# Patient Record
Sex: MALE | Race: WHITE | NOT HISPANIC OR LATINO | Employment: FULL TIME | URBAN - METROPOLITAN AREA
[De-identification: names, ages, dates, MRNs, and addresses within clinical notes are randomized per-mention and may not be internally consistent; named-entity substitution may affect disease eponyms.]

---

## 2021-03-05 ENCOUNTER — IMMUNIZATION (OUTPATIENT)
Dept: VACCINE CLINIC | Facility: HOSPITAL | Age: 36
End: 2021-03-05

## 2021-03-05 PROCEDURE — 0001A: CPT | Performed by: INTERNAL MEDICINE

## 2021-03-05 PROCEDURE — 91300 HC SARSCOV02 VAC 30MCG/0.3ML IM: CPT | Performed by: INTERNAL MEDICINE

## 2021-03-26 ENCOUNTER — IMMUNIZATION (OUTPATIENT)
Dept: VACCINE CLINIC | Facility: HOSPITAL | Age: 36
End: 2021-03-26

## 2021-03-26 PROCEDURE — 0002A: CPT | Performed by: INTERNAL MEDICINE

## 2021-03-26 PROCEDURE — 91300 HC SARSCOV02 VAC 30MCG/0.3ML IM: CPT | Performed by: INTERNAL MEDICINE

## 2023-02-20 ENCOUNTER — OFFICE VISIT (OUTPATIENT)
Dept: INTERNAL MEDICINE | Age: 38
End: 2023-02-20
Payer: COMMERCIAL

## 2023-02-20 ENCOUNTER — PATIENT ROUNDING (BHMG ONLY) (OUTPATIENT)
Dept: INTERNAL MEDICINE | Age: 38
End: 2023-02-20
Payer: COMMERCIAL

## 2023-02-20 VITALS
HEIGHT: 70 IN | TEMPERATURE: 97.6 F | HEART RATE: 68 BPM | OXYGEN SATURATION: 98 % | SYSTOLIC BLOOD PRESSURE: 122 MMHG | BODY MASS INDEX: 44.67 KG/M2 | WEIGHT: 312 LBS | DIASTOLIC BLOOD PRESSURE: 76 MMHG

## 2023-02-20 DIAGNOSIS — Z11.59 NEED FOR HEPATITIS C SCREENING TEST: ICD-10-CM

## 2023-02-20 DIAGNOSIS — Z23 ENCOUNTER FOR IMMUNIZATION: ICD-10-CM

## 2023-02-20 DIAGNOSIS — E66.01 CLASS 3 SEVERE OBESITY DUE TO EXCESS CALORIES WITH SERIOUS COMORBIDITY AND BODY MASS INDEX (BMI) OF 40.0 TO 44.9 IN ADULT: ICD-10-CM

## 2023-02-20 DIAGNOSIS — Z76.89 ENCOUNTER TO ESTABLISH CARE: ICD-10-CM

## 2023-02-20 DIAGNOSIS — Z00.00 ANNUAL PHYSICAL EXAM: Primary | ICD-10-CM

## 2023-02-20 DIAGNOSIS — Z30.09 STERILIZATION CONSULT: ICD-10-CM

## 2023-02-20 PROCEDURE — 99385 PREV VISIT NEW AGE 18-39: CPT

## 2023-02-20 PROCEDURE — 90471 IMMUNIZATION ADMIN: CPT

## 2023-02-20 PROCEDURE — 90715 TDAP VACCINE 7 YRS/> IM: CPT

## 2023-02-20 NOTE — PROGRESS NOTES
"    I N T E R N A L  M E D I C I N E  FAYE Ontiveros      ENCOUNTER DATE:  02/20/2023    Jorge De Jesus / 37 y.o. / male    CHIEF COMPLAINT     Annual Exam and Establish Care    No problems or concerns.    BMI of 44: He reports he has been working with a  regularly and following keto diet.      Reports he has had a prior negative sleep study.    VITALS     Visit Vitals  /76   Pulse 68   Temp 97.6 °F (36.4 °C)   Ht 177.8 cm (70\")   Wt (!) 142 kg (312 lb)   SpO2 98%   BMI 44.77 kg/m²       BP Readings from Last 3 Encounters:   02/20/23 122/76     Wt Readings from Last 3 Encounters:   02/20/23 (!) 142 kg (312 lb)      Body mass index is 44.77 kg/m².      MEDICATIONS     No current outpatient medications on file prior to visit.     No current facility-administered medications on file prior to visit.         HISTORY OF PRESENT ILLNESS      Jorge presents for annual health maintenance visit.    · General health: good  · Lifestyle:  · Attempting to lose weight?: Yes   · Diet: eats a well balanced, healthy diet, focused on whole foods and Keto  · Exercise: exercises 3 days/week  · Tobacco: Former smoker   · Alcohol: does not drink  · Work: Full-time  · Reproductive health:  · Sexually active?: Yes   · Concern for STD?: No   · Sexual problems?: No problems   · Sees Urologist?: No   · Depression Screening:      PHQ-2/PHQ-9 Depression Screening 2/20/2023   Little Interest or Pleasure in Doing Things 0-->not at all   Feeling Down, Depressed or Hopeless 0-->not at all   PHQ-9: Brief Depression Severity Measure Score 0         PHQ-2: 0 (Not depressed)     PHQ-9: 0 (Negative screening for depression)    Patient Care Team:  Jeannette Sal APRN as PCP - General (Family Medicine)  ______________________________________________________________________    ALLERGIES  No Known Allergies     PFSH:     The following portions of the patient's history were reviewed and updated as appropriate: Allergies / Current " Medications / Past Medical History / Surgical History / Social History / Family History    PROBLEM LIST   There is no problem list on file for this patient.      PAST MEDICAL HISTORY  History reviewed. No pertinent past medical history.    SURGICAL HISTORY  History reviewed. No pertinent surgical history.    SOCIAL HISTORY  Social History     Socioeconomic History   • Marital status: Single   Tobacco Use   • Smoking status: Former     Types: Cigarettes   • Smokeless tobacco: Never   Substance and Sexual Activity   • Alcohol use: Not Currently   • Drug use: Never   • Sexual activity: Yes     Partners: Female       FAMILY HISTORY  Family History   Problem Relation Age of Onset   • No Known Problems Mother    • No Known Problems Father    • Brain cancer Brother    • Brain cancer Paternal Uncle        IMMUNIZATION HISTORY  Immunization History   Administered Date(s) Administered   • COVID-19 (PFIZER) PURPLE CAP 03/05/2021, 03/26/2021   • Tdap 02/20/2023         REVIEW OF SYSTEMS     Review of Systems   Constitutional: Negative for chills, fever and unexpected weight change.   Respiratory: Negative for cough, chest tightness and shortness of breath.    Cardiovascular: Negative for chest pain, palpitations and leg swelling.   Neurological: Negative for dizziness, weakness, light-headedness and headaches.   Psychiatric/Behavioral: The patient is not nervous/anxious.        PHYSICAL EXAMINATION     Physical Exam  Vitals reviewed.   Constitutional:       General: He is not in acute distress.     Appearance: Normal appearance. He is not ill-appearing, toxic-appearing or diaphoretic.   HENT:      Head: Normocephalic and atraumatic.      Right Ear: Tympanic membrane, ear canal and external ear normal. There is no impacted cerumen.      Left Ear: Tympanic membrane, ear canal and external ear normal. There is no impacted cerumen.      Nose: Nose normal. No congestion or rhinorrhea.      Mouth/Throat:      Mouth: Mucous membranes  are moist.      Pharynx: Oropharynx is clear. No oropharyngeal exudate or posterior oropharyngeal erythema.   Eyes:      Extraocular Movements: Extraocular movements intact.      Conjunctiva/sclera: Conjunctivae normal.      Pupils: Pupils are equal, round, and reactive to light.   Cardiovascular:      Rate and Rhythm: Normal rate and regular rhythm.      Heart sounds: Normal heart sounds.   Pulmonary:      Effort: Pulmonary effort is normal. No respiratory distress.      Breath sounds: Normal breath sounds.   Abdominal:      General: Bowel sounds are normal.      Palpations: Abdomen is soft.      Tenderness: There is no abdominal tenderness.   Musculoskeletal:         General: Normal range of motion.      Cervical back: Normal range of motion and neck supple.      Right lower leg: No edema.      Left lower leg: No edema.   Lymphadenopathy:      Cervical: No cervical adenopathy.   Skin:     General: Skin is warm and dry.   Neurological:      General: No focal deficit present.      Mental Status: He is alert and oriented to person, place, and time. Mental status is at baseline.   Psychiatric:         Mood and Affect: Mood normal.         Behavior: Behavior normal.         Thought Content: Thought content normal.         Judgment: Judgment normal.         REVIEWED DATA      Labs:    No results found for: NA, K, CALCIUM, AST, ALT, BUN, CREATININE, EGFRIFNONA, EGFRIFAFRI    No results found for: GLUCOSE, HGBA1C, TSH, FREET4    No results found for: PSA    [unfilled]    No results found for: LDL, HDL, TRIG, CHOLHDLRATIO    No components found for: [id number]    No results found for: WBC, HGB, MCV, PLT    No results found for: PROTEIN, GLUCOSEU, BLOODU, NITRITEU, LEUKOCYTESUR     No results found for: HEPCVIRUSABY    Imaging:           Medical Tests:           ASSESSMENT & PLAN     ANNUAL WELLNESS EXAM / PHYSICAL     Diagnoses and all orders for this visit:    1. Annual physical exam (Primary)  -     Hemoglobin A1c  -     CBC  & Differential  -     Comprehensive Metabolic Panel  -     Lipid Panel With / Chol / HDL Ratio  -     TSH  -     Urinalysis With Microscopic If Indicated (No Culture) - Urine, Clean Catch    2. Encounter to establish care    3. Class 3 severe obesity due to excess calories with serious comorbidity and body mass index (BMI) of 40.0 to 44.9 in adult (HCC)  -     Vascular Screening (Bundle) CAR; Future    4. Encounter for immunization  -     Tdap Vaccine Greater Than or Equal To 8yo IM    5. Sterilization consult  -     Ambulatory Referral to Urology    6. Need for hepatitis C screening test  -     Hepatitis C Antibody         Summary/Discussion:     · Agreeable to update labs.  · Declined bariatrics referral.  Would potentially be interested in GLP-1 weight loss management.  No personal/ family history of thyroid disease or pancreatitis.  Reviewed risks/ benefits/ side effects.  Will continue to pursue weight loss through diet and exercise and reach back out in a couple months if interested.      Next Appointment with me: Visit date not found    Return in about 1 year (around 2/20/2024) for Annual physical.      HEALTHCARE MAINTENANCE ISSUES       Cancer Screening:  · Colon: Initial/Next screening at age: 46  · Repeat colon cancer screening: N/A at this time  · Prostate: Start screening at 45 then annually  · Testicular: Recommended monthly self exam  · Skin: Monthly self skin examination, annual exam by health professional  · Lung: Does not meet criteria for lung cancer screening.   · Other:    Screening Labs & Tests:  · Lab results reviewed & discussed with with patient or orders placed today.  · EKG:  · CV Screening: Lipid panel  · DEXA (75+ or risk factors):   · HEP C (If born 8195-4636 or risk factors): Ordered  · Other:     Immunization/Vaccinations (to be given today unless deferred by patient)  · Influenza: Recommended annual influenza vaccine  · Hepatitis A: Verify immunization records  · Hepatitis B: Verify  immunization records  · Tetanus/Pertussis: Administer today  · Pneumovax/PCV: Not needed at this time  · Shingles: Not needed at this time  · COVID: Recommended the bivalent booster shot  Lifestyle Counseling:  · Lifestyle Modifications: Attempt to lose weight, Continue good lifestyle choices/modifications, Increase intensity/regularity of aerobic exercise, Make dinner the lightest meal of day and Reduce exposure to stress if possible  · Safety Issues: Always wear seatbelt, Avoid texting while driving   · Use sunscreen, regular skin examination  · Recommended annual dental/vision examination.  · Emotional/Stress/Sleep: Reviewed and  given when appropriate      Health Maintenance   Topic Date Due   • HEPATITIS C SCREENING  Never done   • COVID-19 Vaccine (3 - Booster for Pfizer series) 02/22/2023 (Originally 5/21/2021)   • INFLUENZA VACCINE  03/31/2023 (Originally 8/1/2022)   • ANNUAL PHYSICAL  02/20/2024   • TDAP/TD VACCINES (2 - Td or Tdap) 02/20/2033   • Pneumococcal Vaccine 0-64  Aged Out

## 2023-02-20 NOTE — PROGRESS NOTES
A My-Chart message has been sent to the patient [place] [facility] [place]

## 2023-02-21 PROBLEM — E78.00 PURE HYPERCHOLESTEROLEMIA: Status: ACTIVE | Noted: 2023-02-21

## 2023-02-21 LAB
ALBUMIN SERPL-MCNC: 4.3 G/DL (ref 4–5)
ALBUMIN/GLOB SERPL: 1.5 {RATIO} (ref 1.2–2.2)
ALP SERPL-CCNC: 74 IU/L (ref 44–121)
ALT SERPL-CCNC: 27 IU/L (ref 0–44)
APPEARANCE UR: CLEAR
AST SERPL-CCNC: 25 IU/L (ref 0–40)
BASOPHILS # BLD AUTO: 0.1 X10E3/UL (ref 0–0.2)
BASOPHILS NFR BLD AUTO: 1 %
BILIRUB SERPL-MCNC: 0.4 MG/DL (ref 0–1.2)
BILIRUB UR QL STRIP: NEGATIVE
BUN SERPL-MCNC: 13 MG/DL (ref 6–20)
BUN/CREAT SERPL: 16 (ref 9–20)
CALCIUM SERPL-MCNC: 8.9 MG/DL (ref 8.7–10.2)
CHLORIDE SERPL-SCNC: 105 MMOL/L (ref 96–106)
CHOLEST SERPL-MCNC: 186 MG/DL (ref 100–199)
CHOLEST/HDLC SERPL: 4.8 RATIO (ref 0–5)
CO2 SERPL-SCNC: 20 MMOL/L (ref 20–29)
COLOR UR: YELLOW
CREAT SERPL-MCNC: 0.81 MG/DL (ref 0.76–1.27)
EGFRCR SERPLBLD CKD-EPI 2021: 117 ML/MIN/1.73
EOSINOPHIL # BLD AUTO: 0.1 X10E3/UL (ref 0–0.4)
EOSINOPHIL NFR BLD AUTO: 2 %
ERYTHROCYTE [DISTWIDTH] IN BLOOD BY AUTOMATED COUNT: 13.2 % (ref 11.6–15.4)
GLOBULIN SER CALC-MCNC: 2.8 G/DL (ref 1.5–4.5)
GLUCOSE SERPL-MCNC: 92 MG/DL (ref 70–99)
GLUCOSE UR QL STRIP: NEGATIVE
HBA1C MFR BLD: 5.1 % (ref 4.8–5.6)
HCT VFR BLD AUTO: 44.5 % (ref 37.5–51)
HCV IGG SERPL QL IA: NON REACTIVE
HDLC SERPL-MCNC: 39 MG/DL
HGB BLD-MCNC: 14.8 G/DL (ref 13–17.7)
HGB UR QL STRIP: NEGATIVE
IMM GRANULOCYTES # BLD AUTO: 0 X10E3/UL (ref 0–0.1)
IMM GRANULOCYTES NFR BLD AUTO: 0 %
KETONES UR QL STRIP: NEGATIVE
LDLC SERPL CALC-MCNC: 130 MG/DL (ref 0–99)
LEUKOCYTE ESTERASE UR QL STRIP: NEGATIVE
LYMPHOCYTES # BLD AUTO: 1.2 X10E3/UL (ref 0.7–3.1)
LYMPHOCYTES NFR BLD AUTO: 21 %
MCH RBC QN AUTO: 30.6 PG (ref 26.6–33)
MCHC RBC AUTO-ENTMCNC: 33.3 G/DL (ref 31.5–35.7)
MCV RBC AUTO: 92 FL (ref 79–97)
MICRO URNS: NORMAL
MONOCYTES # BLD AUTO: 0.4 X10E3/UL (ref 0.1–0.9)
MONOCYTES NFR BLD AUTO: 8 %
NEUTROPHILS # BLD AUTO: 3.7 X10E3/UL (ref 1.4–7)
NEUTROPHILS NFR BLD AUTO: 68 %
NITRITE UR QL STRIP: NEGATIVE
PH UR STRIP: 6.5 [PH] (ref 5–7.5)
PLATELET # BLD AUTO: 214 X10E3/UL (ref 150–450)
POTASSIUM SERPL-SCNC: 4.6 MMOL/L (ref 3.5–5.2)
PROT SERPL-MCNC: 7.1 G/DL (ref 6–8.5)
PROT UR QL STRIP: NEGATIVE
RBC # BLD AUTO: 4.84 X10E6/UL (ref 4.14–5.8)
SODIUM SERPL-SCNC: 139 MMOL/L (ref 134–144)
SP GR UR STRIP: 1.01 (ref 1–1.03)
TRIGL SERPL-MCNC: 90 MG/DL (ref 0–149)
TSH SERPL DL<=0.005 MIU/L-ACNC: 1.32 UIU/ML (ref 0.45–4.5)
UROBILINOGEN UR STRIP-MCNC: 0.2 MG/DL (ref 0.2–1)
VLDLC SERPL CALC-MCNC: 17 MG/DL (ref 5–40)
WBC # BLD AUTO: 5.4 X10E3/UL (ref 3.4–10.8)

## 2023-08-08 ENCOUNTER — HOSPITAL ENCOUNTER (OUTPATIENT)
Dept: CARDIOLOGY | Facility: HOSPITAL | Age: 38
Discharge: HOME | End: 2023-08-08

## 2023-08-08 VITALS
WEIGHT: 300 LBS | HEIGHT: 71 IN | SYSTOLIC BLOOD PRESSURE: 109 MMHG | DIASTOLIC BLOOD PRESSURE: 69 MMHG | BODY MASS INDEX: 42 KG/M2 | HEART RATE: 47 BPM

## 2023-08-08 DIAGNOSIS — E66.01 CLASS 3 SEVERE OBESITY DUE TO EXCESS CALORIES WITH SERIOUS COMORBIDITY AND BODY MASS INDEX (BMI) OF 40.0 TO 44.9 IN ADULT: ICD-10-CM

## 2023-08-08 LAB
BH CV ECHO MEAS - DIST AO DIAM: 1.58 CM
BH CV VAS BP LEFT ARM: NORMAL MMHG
BH CV VAS BP RIGHT ARM: NORMAL MMHG
BH CV XLRA MEAS - MID AO DIAM: 1.85 CM
BH CV XLRA MEAS - PAD LEFT ABI DP: 1.36
BH CV XLRA MEAS - PAD LEFT ABI PT: 1.37
BH CV XLRA MEAS - PAD LEFT ARM: 108 MMHG
BH CV XLRA MEAS - PAD LEFT LEG DP: 148 MMHG
BH CV XLRA MEAS - PAD LEFT LEG PT: 149 MMHG
BH CV XLRA MEAS - PAD RIGHT ABI DP: 1.37
BH CV XLRA MEAS - PAD RIGHT ABI PT: 1.35
BH CV XLRA MEAS - PAD RIGHT ARM: 109 MMHG
BH CV XLRA MEAS - PAD RIGHT LEG DP: 149 MMHG
BH CV XLRA MEAS - PAD RIGHT LEG PT: 147 MMHG
BH CV XLRA MEAS - PROX AO DIAM: 2 CM
BH CV XLRA MEAS LEFT ICA/CCA RATIO: 0.68
BH CV XLRA MEAS LEFT MID CCA PSV: NORMAL CM/SEC
BH CV XLRA MEAS LEFT MID ICA PSV: NORMAL CM/SEC
BH CV XLRA MEAS LEFT PROX ECA PSV: NORMAL CM/SEC
BH CV XLRA MEAS RIGHT ICA/CCA RATIO: 1
BH CV XLRA MEAS RIGHT MID CCA PSV: NORMAL CM/SEC
BH CV XLRA MEAS RIGHT MID ICA PSV: NORMAL CM/SEC
BH CV XLRA MEAS RIGHT PROX ECA PSV: NORMAL CM/SEC
MAXIMAL PREDICTED HEART RATE: 184
STRESS TARGET HR: 156

## 2023-08-08 PROCEDURE — 93799 UNLISTED CV SVC/PROCEDURE: CPT

## 2024-03-21 ENCOUNTER — OFFICE VISIT (OUTPATIENT)
Dept: INTERNAL MEDICINE | Age: 39
End: 2024-03-21
Payer: COMMERCIAL

## 2024-03-21 VITALS
DIASTOLIC BLOOD PRESSURE: 70 MMHG | BODY MASS INDEX: 42.7 KG/M2 | SYSTOLIC BLOOD PRESSURE: 134 MMHG | HEIGHT: 71 IN | HEART RATE: 78 BPM | TEMPERATURE: 96.7 F | OXYGEN SATURATION: 96 % | WEIGHT: 305 LBS

## 2024-03-21 DIAGNOSIS — Z00.00 ANNUAL PHYSICAL EXAM: Primary | ICD-10-CM

## 2024-03-21 DIAGNOSIS — E78.00 PURE HYPERCHOLESTEROLEMIA: ICD-10-CM

## 2024-03-21 DIAGNOSIS — R03.0 ELEVATED BLOOD PRESSURE READING IN OFFICE WITHOUT DIAGNOSIS OF HYPERTENSION: ICD-10-CM

## 2024-03-21 DIAGNOSIS — R68.82 REDUCED LIBIDO: ICD-10-CM

## 2024-03-21 DIAGNOSIS — L65.9 HAIR LOSS: ICD-10-CM

## 2024-03-21 PROCEDURE — 99395 PREV VISIT EST AGE 18-39: CPT

## 2024-03-21 RX ORDER — DEXTROAMPHETAMINE SACCHARATE, AMPHETAMINE ASPARTATE, DEXTROAMPHETAMINE SULFATE AND AMPHETAMINE SULFATE 2.5; 2.5; 2.5; 2.5 MG/1; MG/1; MG/1; MG/1
TABLET ORAL
COMMUNITY
Start: 2024-03-08

## 2024-03-21 NOTE — PROGRESS NOTES
"    I N T E R N A L  M E D I C I N E  Jeannettekatie Sal, APRRUBEN      ENCOUNTER DATE:  03/21/2024    Jorge Wilkinson De Jesus / 38 y.o. / male    CHIEF COMPLAINT     Annual Exam    He will be getting  this fall in [place].  Continues to work in theater.      BMI of 42: He continues to work with  3x weekly and is following keto diet/ intermittent fasting.       HLD: February 20, 2023 lipid panel with elevated ; normal triglycerides 90.    ADHD: Now on Adderall 10 mg TID with benefit for the last 4 months.  Followed by psychiatry, [facility].       Has noticed some recent hair loss around crown of head.  Also some low libido as of late.   He is requesting testosterone labs at today's visit.        VITALS     Visit Vitals  /70   Pulse 78   Temp 96.7 °F (35.9 °C)   Ht 180.3 cm (70.98\")   Wt (!) 138 kg (305 lb)   SpO2 96%   BMI 42.56 kg/m²       BP Readings from Last 3 Encounters:   03/21/24 134/70   08/08/23 109/69   02/20/23 122/76     Wt Readings from Last 3 Encounters:   03/21/24 (!) 138 kg (305 lb)   08/08/23 136 kg (300 lb)   02/20/23 (!) 142 kg (312 lb)      Body mass index is 42.56 kg/m².      MEDICATIONS     Current Outpatient Medications on File Prior to Visit   Medication Sig Dispense Refill    amphetamine-dextroamphetamine (ADDERALL) 10 MG tablet        No current facility-administered medications on file prior to visit.         HISTORY OF PRESENT ILLNESS      Jorge presents for annual health maintenance visit.    General health: good  Lifestyle:  Attempting to lose weight?: Yes   Diet: eats a well balanced, healthy diet  Exercise: exercises 3-5 days weekly  Tobacco: Never used   Alcohol: 2 days/week and 3 drinks/occasion   Work: Full-time  Reproductive health:  Sexually active?: Yes   Concern for STD?: No   Sexual problems?: diminished libido   Sees Urologist?: No   Depression Screening:          3/21/2024    10:02 AM   PHQ-2/PHQ-9 Depression Screening   Little Interest or Pleasure " in Doing Things 0-->not at all   Feeling Down, Depressed or Hopeless 0-->not at all   PHQ-9: Brief Depression Severity Measure Score 0         PHQ-2: 0 (Not depressed)     PHQ-9: 0 (Negative screening for depression)    Patient Care Team:  Jeannette Sal APRN as PCP - General (Family Medicine)  ______________________________________________________________________    ALLERGIES  No Known Allergies     PFSH:     The following portions of the patient's history were reviewed and updated as appropriate: Allergies / Current Medications / Past Medical History / Surgical History / Social History / Family History    PROBLEM LIST   Patient Active Problem List   Diagnosis    Pure hypercholesterolemia    ADHD (attention deficit hyperactivity disorder)       PAST MEDICAL HISTORY  Past Medical History:   Diagnosis Date    ADHD (attention deficit hyperactivity disorder)        SURGICAL HISTORY  Past Surgical History:   Procedure Laterality Date    VASECTOMY         SOCIAL HISTORY  Social History     Socioeconomic History    Marital status: Single   Tobacco Use    Smoking status: Former     Types: Cigarettes    Smokeless tobacco: Never   Vaping Use    Vaping status: Never Used   Substance and Sexual Activity    Alcohol use: Not Currently    Drug use: Never    Sexual activity: Yes     Partners: Female       FAMILY HISTORY  Family History   Problem Relation Age of Onset    No Known Problems Mother     No Known Problems Father     Brain cancer Brother     Brain cancer Paternal Uncle        IMMUNIZATION HISTORY  Immunization History   Administered Date(s) Administered    COVID-19 (PFIZER) Purple Cap Monovalent 03/05/2021, 03/26/2021    Tdap 02/20/2023         REVIEW OF SYSTEMS     Review of Systems   Constitutional:  Negative for chills, fever and unexpected weight change.   Respiratory:  Negative for cough, chest tightness and shortness of breath.    Cardiovascular:  Negative for chest pain, palpitations and leg swelling.   Endocrine:         +Low libido, hair loss   Neurological:  Negative for dizziness, weakness, light-headedness and headaches.   Psychiatric/Behavioral:  The patient is not nervous/anxious.        PHYSICAL EXAMINATION     Physical Exam  Vitals reviewed.   Constitutional:       General: He is not in acute distress.     Appearance: Normal appearance. He is not ill-appearing, toxic-appearing or diaphoretic.   HENT:      Head: Normocephalic and atraumatic.      Right Ear: Tympanic membrane, ear canal and external ear normal. There is no impacted cerumen.      Left Ear: Tympanic membrane, ear canal and external ear normal. There is no impacted cerumen.      Nose: Nose normal. No congestion or rhinorrhea.      Mouth/Throat:      Mouth: Mucous membranes are moist.      Pharynx: Oropharynx is clear. No oropharyngeal exudate or posterior oropharyngeal erythema.   Eyes:      Extraocular Movements: Extraocular movements intact.      Conjunctiva/sclera: Conjunctivae normal.      Pupils: Pupils are equal, round, and reactive to light.   Cardiovascular:      Rate and Rhythm: Normal rate and regular rhythm.      Heart sounds: Normal heart sounds.   Pulmonary:      Effort: Pulmonary effort is normal. No respiratory distress.      Breath sounds: Normal breath sounds.   Abdominal:      General: Bowel sounds are normal.      Palpations: Abdomen is soft.      Tenderness: There is no abdominal tenderness.   Musculoskeletal:         General: Normal range of motion.      Cervical back: Normal range of motion and neck supple.      Right lower leg: No edema.      Left lower leg: No edema.   Lymphadenopathy:      Cervical: No cervical adenopathy.   Skin:     General: Skin is warm and dry.      Comments: Thinning hair at crown, no skin changes   Neurological:      General: No focal deficit present.      Mental Status: He is alert and oriented to person, place, and time. Mental status is at baseline.   Psychiatric:         Mood and Affect: Mood normal.     "     Behavior: Behavior normal.         Thought Content: Thought content normal.         Judgment: Judgment normal.         REVIEWED DATA      Labs:    Lab Results   Component Value Date     02/20/2023    K 4.6 02/20/2023    CALCIUM 8.9 02/20/2023    AST 25 02/20/2023    ALT 27 02/20/2023    BUN 13 02/20/2023    CREATININE 0.81 02/20/2023       Lab Results   Component Value Date    GLUCOSE 92 02/20/2023    HGBA1C 5.1 02/20/2023    TSH 1.320 02/20/2023       No results found for: \"PSA\"    [unfilled]    Lab Results   Component Value Date     (H) 02/20/2023    HDL 39 (L) 02/20/2023    TRIG 90 02/20/2023    CHOLHDLRATIO 4.8 02/20/2023       No components found for: \"CJUH136R\"    Lab Results   Component Value Date    WBC 5.4 02/20/2023    HGB 14.8 02/20/2023    MCV 92 02/20/2023     02/20/2023       Lab Results   Component Value Date    PROTEIN Negative 02/20/2023    GLUCOSEU Negative 02/20/2023    BLOODU Negative 02/20/2023    NITRITEU Negative 02/20/2023    LEUKOCYTESUR Negative 02/20/2023          Lab Results   Component Value Date    HEPCVIRUSABY Non Reactive 02/20/2023       Imaging:           Medical Tests:           ASSESSMENT & PLAN     ANNUAL WELLNESS EXAM / PHYSICAL     Diagnoses and all orders for this visit:    1. Annual physical exam (Primary)  -     Cancel: CBC & Differential  -     Cancel: Hemoglobin A1c  -     Cancel: TSH+Free T4  -     Urinalysis With Microscopic If Indicated (No Culture) - Urine, Clean Catch  -     CBC & Differential; Future  -     Hemoglobin A1c; Future  -     TSH+Free T4; Future  -     Urinalysis without microscopic (no culture) - Urine, Clean Catch; Future  -     ABO/Rh; Future    2. Elevated blood pressure reading in office without diagnosis of hypertension    3. Pure hypercholesterolemia  -     Cancel: Comprehensive Metabolic Panel  -     Cancel: Lipid Panel With / Chol / HDL Ratio  -     Comprehensive Metabolic Panel; Future  -     Lipid Panel With / Chol / HDL " Ratio; Future    4. Reduced libido  -     Cancel: Testosterone, Free, Total  -     Testosterone, Free, Total; Future    5. Hair loss  -     Cancel: Vitamin B12  -     Cancel: Vitamin D,25-Hydroxy  -     Vitamin D,25-Hydroxy; Future  -     Vitamin B12; Future         Summary/Discussion:     Recommend monitoring BP at home to ensure it is averaging < 130/80.  Follow low sodium diet.  Labs ordered to investigate reduced libido, hair loss, including thyroid, testosterone, B12, Vitamin D.  He has had prior negative sleep study.      Class 3 Severe Obesity (BMI >=40). Obesity-related health conditions include the following: dyslipidemias. Obesity is unchanged. BMI is is above average; BMI management plan is completed. We discussed portion control and increasing exercise.      Next Appointment with me: Visit date not found    Return for Schedule early am fasting lab appointment, 1 year annual physical.      HEALTHCARE MAINTENANCE ISSUES       Cancer Screening:  Colon: Initial/Next screening at age: 45  Repeat colon cancer screening: N/A at this time  Prostate: Start screening at 50 then annually  Testicular: Recommended monthly self exam  Skin: Monthly self skin examination, annual exam by health professional  Lung: Does not meet criteria for lung cancer screening.   Other:    Screening Labs & Tests:  Lab results reviewed & discussed with with patient or orders placed today.  EKG:  CV Screening: Lipid panel  DEXA (75+ or risk factors):   HEP C (If born 6624-2443 or risk factors): Previously had negative screen  Other:     Immunization/Vaccinations (to be given today unless deferred by patient)  Influenza: Recommended annual influenza vaccine  Hepatitis A: Verify immunization records  Hepatitis B: Verify immunization records  Tetanus/Pertussis: Up to date  Pneumovax/PCV: Not needed at this time  Shingles: Not needed at this time  COVID: Considering the latest booster   Lifestyle Counseling:  Lifestyle Modifications: Attempt  to lose weight, Continue good lifestyle choices/modifications, Follow a low fat, low cholesterol diet, Decrease or avoid alcohol intake, and Reduce exposure to stress if possible  Safety Issues: Always wear seatbelt, Avoid texting while driving   Use sunscreen, regular skin examination  Recommended annual dental/vision examination.  Emotional/Stress/Sleep: Reviewed and  given when appropriate      Health Maintenance   Topic Date Due    BMI FOLLOWUP  Never done    LIPID PANEL  02/20/2024    COVID-19 Vaccine (3 - 2023-24 season) 03/23/2024 (Originally 9/1/2023)    INFLUENZA VACCINE  03/31/2024 (Originally 8/1/2023)    ANNUAL PHYSICAL  03/21/2025    TDAP/TD VACCINES (2 - Td or Tdap) 02/20/2033    HEPATITIS C SCREENING  Completed    Pneumococcal Vaccine 0-64  Aged Out

## 2024-05-25 ENCOUNTER — HOSPITAL ENCOUNTER (EMERGENCY)
Facility: HOSPITAL | Age: 39
Discharge: HOME | End: 2024-05-25
Attending: EMERGENCY MEDICINE
Payer: OTHER MISCELLANEOUS

## 2024-05-25 ENCOUNTER — APPOINTMENT (OUTPATIENT)
Dept: GENERAL RADIOLOGY | Facility: HOSPITAL | Age: 39
End: 2024-05-25
Payer: OTHER MISCELLANEOUS

## 2024-05-25 VITALS
SYSTOLIC BLOOD PRESSURE: 142 MMHG | OXYGEN SATURATION: 97 % | HEIGHT: 70 IN | RESPIRATION RATE: 16 BRPM | BODY MASS INDEX: 41.52 KG/M2 | WEIGHT: 290 LBS | TEMPERATURE: 98 F | HEART RATE: 58 BPM | DIASTOLIC BLOOD PRESSURE: 80 MMHG
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
## 2024-05-25 DIAGNOSIS — S52.135A CLOSED NONDISPLACED FRACTURE OF NECK OF LEFT RADIUS, INITIAL ENCOUNTER: Primary | ICD-10-CM

## 2024-05-25 PROCEDURE — 99283 EMERGENCY DEPT VISIT LOW MDM: CPT

## 2024-05-25 PROCEDURE — 73070 X-RAY EXAM OF ELBOW: CPT

## 2024-05-25 PROCEDURE — 73090 X-RAY EXAM OF FOREARM: CPT

## 2024-05-25 RX ORDER — HYDROCODONE BITARTRATE AND ACETAMINOPHEN 5; 325 MG/1; MG/1
1 TABLET ORAL EVERY 6 HOURS PRN
Qty: 12 TABLET | Refills: 0 | Status: SHIPPED | OUTPATIENT
Start: 2024-05-25

## 2024-05-25 NOTE — ED PROVIDER NOTES
MD ATTESTATION NOTE    SHARED VISIT: This visit was performed by BOTH a physician and an APC. The substantive portion of the medical decision making was performed by this attesting physician who made or approved the management plan and takes responsibility for patient management. All studies in the APC note (if performed) were independently interpreted by me.     The BOBBY and I have discussed this patient's history, physical exam, and treatment plan.  I have reviewed the documentation and affirm the documentation and agree with the treatment and plan.  The attached note describes my personal findings.      Independent Historians: Patient    A complete HPI/ROS/PMH/PSH/SH/FH are unobtainable due to: None    Chronic or social conditions impacting patient care (social determinants of health): None    Jorge De Jesus is a 38 y.o. male who presents to the ED c/o acute left forearm pain after a fall with outstretched arms.  Patient describes maximal pain in the proximal forearm and elbow.  Patient went to urgent care today and was told to come to the ER for further evaluation.  Patient is right-handed but does work with his hands.  He denies hitting his head or losing consciousness.  He denies any numbness or tingling or inability to use his left hand.          On exam:  GENERAL: Pleasant cooperative and conversant male, alert, no acute distress  SKIN: Warm, dry  HENT: Normocephalic, atraumatic  RESPIRATORY: Relaxed breathing  MUSCULOSKELETAL: no deformity, 2+ radial pulse left upper extremity, full range of motion of left hand including a thumbs up and finger extension and flexion.  NEURO: alert, moves all extremities, follows commands                                                            Radiology  XR ELBOW 2 VIEW LEFT, XR Forearm 2 View Left    Result Date: 5/25/2024  XR FOREARM 2 VW LEFT-, XR ELBOW 2 VW LEFT-   INDICATION: Fall on outstretched arm. Pain.  COMPARISON: None  TECHNIQUE: 2 view left forearm and 2  view left elbow  FINDINGS:  Nondisplaced radial neck fracture. No dislocation. Anterior sail sign. Posterior fat pad sign.       1. Nondisplaced radial neck fracture. 2. Elbow effusion present  This report was finalized on 5/25/2024 2:33 PM by Dr. Glenn Pavon M.D on Workstation: [id number]       Medical Decision Making:  ED Course as of 05/26/24 1958   Sat May 25, 2024   1438 X-ray of left elbow independently interpreted by me as radial neck fracture [MP]   1635 Patient presents to emergency department with left elbow pain after fall.  Worked up with x-ray of the left elbow and forearm, noted to have nondisplaced radial neck fracture.  Will place patient in arm sling and have him follow-up with orthopedics as well as PCP.  Discussed ED return precautions.  He is otherwise well-appearing, hemodynamically stable, and therefore appropriate for discharge. [MP]      ED Course User Index  [MP] Mirella Reis, ANGIE       MDM: Patient presents with an isolated extremity injury after a fall.  Plan for x-rays to further evaluate for dislocation, fracture, among other possibilities.    Procedures:  Procedures        PPE: I followed hospital protocols for proper PPE based on patient presentation including use of N95 mask for suspected infectious respiratory conditions.  Proper hand hygiene was performed both before and after the patient encounter.          Diagnosis  Final diagnoses:   Closed nondisplaced fracture of neck of left radius, initial encounter   Fall, initial encounter       Note Disclaimer: At [facility], we believe that sharing information builds trust and better relationships. You are receiving this note because you recently visited [facility]. It is possible you will see health information before a provider has talked with you about it. This kind of information can be easy to misunderstand. To help you fully understand what it means for your health, we urge you to discuss this note with your  provider.       Mere Geronimo MD  05/26/24 2000

## 2024-05-25 NOTE — ED PROVIDER NOTES
EMERGENCY DEPARTMENT ENCOUNTER  Room Number:  34/34  PCP: Jeannette Sal APRN  Independent Historians: Patient      HPI:  Chief Complaint: had concerns including Fall (Fall on arm at work).     A complete HPI/ROS/PMH/PSH/SH/FH are unobtainable due to: None    Chronic or social conditions impacting patient care (Social Determinants of Health): None      Context: Jorge De Jesus is a 38 y.o. male with a medical history of hyperlipidemia and ADHD who presents to the ED c/o acute left forearm pain.  Patient reports he was at work last night when he tripped and fell forward with his arms outstretched.  He developed pain in the left proximal forearm and elbow.  He went to urgent care today and was referred to the emergency department for further workup.  He is right-hand dominant.  He denies head injury or LOC.  No other systemic complaints at this time.      Review of prior external notes (non-ED) -and- Review of prior external test results outside of this encounter:  Patient seen at urgent care today for left elbow pain.  Patient referred to emergency department.  Reviewed labs collected on 3/25/2024.  CBC with hemoglobin 13.4, CMP with creatinine 1.03.    Prescription drug monitoring program review:     N/A    PAST MEDICAL HISTORY  Active Ambulatory Problems     Diagnosis Date Noted    Pure hypercholesterolemia 02/21/2023    ADHD (attention deficit hyperactivity disorder)      Resolved Ambulatory Problems     Diagnosis Date Noted    No Resolved Ambulatory Problems     No Additional Past Medical History         PAST SURGICAL HISTORY  Past Surgical History:   Procedure Laterality Date    VASECTOMY           FAMILY HISTORY  Family History   Problem Relation Age of Onset    No Known Problems Mother     No Known Problems Father     Brain cancer Brother     Brain cancer Paternal Uncle          SOCIAL HISTORY  Social History     Socioeconomic History    Marital status: Single   Tobacco Use    Smoking status: Former      Types: Cigarettes    Smokeless tobacco: Never   Vaping Use    Vaping status: Never Used   Substance and Sexual Activity    Alcohol use: Not Currently     Comment: occ    Drug use: Never    Sexual activity: Yes     Partners: Female         ALLERGIES  Patient has no known allergies.      REVIEW OF SYSTEMS  Review of Systems   Constitutional:  Negative for chills and fever.   HENT:  Negative for ear pain and sore throat.    Respiratory:  Negative for cough and shortness of breath.    Cardiovascular:  Negative for chest pain and palpitations.   Gastrointestinal:  Negative for abdominal pain and vomiting.   Genitourinary:  Negative for dysuria and hematuria.   Musculoskeletal:  Positive for arthralgias. Negative for joint swelling.   Skin:  Negative for pallor and rash.   Neurological:  Negative for syncope and headaches.   Psychiatric/Behavioral:  Negative for confusion and hallucinations.      Included in HPI  All systems reviewed and negative except for those discussed in HPI.      PHYSICAL EXAM    I have reviewed the triage vital signs and nursing notes.    ED Triage Vitals   Temp Heart Rate Resp BP SpO2   05/25/24 1342 05/25/24 1342 05/25/24 1342 05/25/24 1353 05/25/24 1342   98 °F (36.7 °C) 78 16 142/80 96 %      Temp src Heart Rate Source Patient Position BP Location FiO2 (%)   05/25/24 1342 05/25/24 1342 05/25/24 1353 05/25/24 1353 --   Tympanic Monitor Lying Right arm        Physical Exam  Constitutional:       General: He is not in acute distress.     Appearance: Normal appearance.   HENT:      Head: Normocephalic and atraumatic.      Nose: Nose normal.      Mouth/Throat:      Mouth: Mucous membranes are moist.   Eyes:      Conjunctiva/sclera: Conjunctivae normal.      Pupils: Pupils are equal, round, and reactive to light.   Cardiovascular:      Rate and Rhythm: Normal rate and regular rhythm.      Pulses: Normal pulses.      Heart sounds: Normal heart sounds.   Pulmonary:      Effort: Pulmonary effort is  normal.      Breath sounds: Normal breath sounds.   Abdominal:      General: There is no distension.   Musculoskeletal:         General: Normal range of motion.      Cervical back: Normal range of motion and neck supple.   Skin:     General: Skin is warm.      Capillary Refill: Capillary refill takes less than 2 seconds.   Neurological:      General: No focal deficit present.      Mental Status: He is alert and oriented to person, place, and time.   Psychiatric:         Mood and Affect: Mood normal.           RADIOLOGY  XR ELBOW 2 VIEW LEFT, XR Forearm 2 View Left    Result Date: 5/25/2024  XR FOREARM 2 VW LEFT-, XR ELBOW 2 VW LEFT-   INDICATION: Fall on outstretched arm. Pain.  COMPARISON: None  TECHNIQUE: 2 view left forearm and 2 view left elbow  FINDINGS:  Nondisplaced radial neck fracture. No dislocation. Anterior sail sign. Posterior fat pad sign.       1. Nondisplaced radial neck fracture. 2. Elbow effusion present  This report was finalized on 5/25/2024 2:33 PM by Dr. Glenn Pavon M.D on Workstation: [id number]         MEDICATIONS GIVEN IN ER  Medications - No data to display      ORDERS PLACED DURING THIS VISIT:  Orders Placed This Encounter   Procedures    [facility] 02.  Shoulder Immobilizer/Sling    XR ELBOW 2 VIEW LEFT    XR Forearm 2 View Left         OUTPATIENT MEDICATION MANAGEMENT:  No current Epic-ordered facility-administered medications on file.     Current Outpatient Medications Ordered in Epic   Medication Sig Dispense Refill    amphetamine-dextroamphetamine (ADDERALL) 10 MG tablet 2 tablets 2 (Two) Times a Day.      HYDROcodone-acetaminophen (NORCO) 5-325 MG per tablet Take 1 tablet by mouth Every 6 (Six) Hours As Needed for Moderate Pain or Severe Pain. 12 tablet 0         PROGRESS, DATA ANALYSIS, CONSULTS, AND MEDICAL DECISION MAKING  All labs have been independently interpreted by me.  All radiology studies have been reviewed by me. All EKG's have been independently viewed  and interpreted by me.  Discussion below represents my analysis of pertinent findings related to patient's condition, differential diagnosis, treatment plan and final disposition.    Differential diagnosis includes but is not limited to radial head fracture, olecranon fracture, distal radius fracture.        ED Course as of 05/26/24 1040   Sat May 25, 2024   1438 X-ray of left elbow independently interpreted by me as radial neck fracture [MP]   1635 Patient presents to emergency department with left elbow pain after fall.  Worked up with x-ray of the left elbow and forearm, noted to have nondisplaced radial neck fracture.  Will place patient in arm sling and have him follow-up with orthopedics as well as PCP.  Discussed ED return precautions.  He is otherwise well-appearing, hemodynamically stable, and therefore appropriate for discharge. [MP]      ED Course User Index  [MP] Mirella Reis PA-C             AS OF 10:38 EDT VITALS:    BP - 142/80  HR - 58  TEMP - 98 °F (36.7 °C) (Tympanic)  O2 SATS - 97%    COMPLEXITY OF CARE  Admission was considered but after careful review of the patient's presentation, physical examination, diagnostic results, and response to treatment the patient may be safely discharged with outpatient follow-up.      DIAGNOSIS  Final diagnoses:   Closed nondisplaced fracture of neck of left radius, initial encounter   Fall, initial encounter         DISPOSITION  ED Disposition       ED Disposition   Discharge    Condition   Stable    Comment   --                Please note that portions of this document were completed with a voice recognition program.    Note Disclaimer: At [facility], we believe that sharing information builds trust and better relationships. You are receiving this note because you recently visited [facility]. It is possible you will see health information before a provider has talked with you about it. This kind of information can be easy to misunderstand. To help  you fully understand what it means for your health, we urge you to discuss this note with your provider.         Mirella Reis PA-C  05/26/24 1041

## 2024-05-25 NOTE — DISCHARGE INSTRUCTIONS
Follow-up with Dr. Rust next week.  Follow-up with primary care provider.  Wear sling for support.  Use Tylenol or ibuprofen for pain.  Use hydrocodone as needed for severe pain.  This is a narcotic medication and has potential for addiction.  This may cause drowsiness so do not take before driving.  Ice the elbow to help with pain and swelling.  Return to emergency department for any worsening symptoms.

## 2024-09-16 ENCOUNTER — OFFICE VISIT (OUTPATIENT)
Dept: INTERNAL MEDICINE | Age: 39
End: 2024-09-16
Payer: COMMERCIAL

## 2024-09-16 VITALS
WEIGHT: 269 LBS | DIASTOLIC BLOOD PRESSURE: 78 MMHG | SYSTOLIC BLOOD PRESSURE: 122 MMHG | TEMPERATURE: 96 F | RESPIRATION RATE: 18 BRPM | HEIGHT: 70 IN | HEART RATE: 62 BPM | OXYGEN SATURATION: 98 % | BODY MASS INDEX: 38.51 KG/M2

## 2024-09-16 DIAGNOSIS — Z86.19 H/O COLD SORES: Primary | ICD-10-CM

## 2024-09-16 DIAGNOSIS — G47.9 SLEEP DISTURBANCE: ICD-10-CM

## 2024-09-16 PROCEDURE — 99214 OFFICE O/P EST MOD 30 MIN: CPT

## 2024-09-16 RX ORDER — VALACYCLOVIR HYDROCHLORIDE 500 MG/1
500 TABLET, FILM COATED ORAL DAILY
Qty: 90 TABLET | Refills: 1 | Status: SHIPPED | OUTPATIENT
Start: 2024-09-16

## 2024-09-16 RX ORDER — HYDROXYZINE HYDROCHLORIDE 25 MG/1
25 TABLET, FILM COATED ORAL NIGHTLY PRN
Qty: 30 TABLET | Refills: 0 | Status: SHIPPED | OUTPATIENT
Start: 2024-09-16

## 2024-09-17 LAB
HSV1 IGG SER IA-ACNC: 26.3 INDEX (ref 0–0.9)
HSV2 IGG SER IA-ACNC: <0.91 INDEX (ref 0–0.9)

## 2024-11-25 DIAGNOSIS — G47.9 SLEEP DISTURBANCE: ICD-10-CM

## 2024-11-25 RX ORDER — HYDROXYZINE HYDROCHLORIDE 25 MG/1
25 TABLET, FILM COATED ORAL NIGHTLY PRN
Qty: 30 TABLET | Refills: 0 | Status: SHIPPED | OUTPATIENT
Start: 2024-11-25

## 2025-03-24 NOTE — PROGRESS NOTES
"    I N T E R N A L  M E D I C I N E  Jeannette Sal, APRRUBEN      ENCOUNTER DATE:  03/25/2025    Jorge De Jesus / 39 y.o. / male    CHIEF COMPLAINT     Annual Exam (Pt states he would like to know his blood type. Discuss IT band tingling. )      BP elevated at today's visit, 156/88.  Not monitoring BP at home.  Denies any family history of HTN.  Asymptomatic.  BMI now 47, up 59 pounds since September 2024.  He works with  2x weekly, weight lifting.  Not focused on diet as of late.  Is doing intermittent fasting.       HLD: March 2025 lipid panel with elevated ; normal triglycerides 112.     ADHD: Symptoms well controlled on Vyvanse 50 mg daily with benefit (taking 4 days a week).  Followed by psychiatry, [facility].       Around 2018, developed \"pins and needles\" sensation of left lateral thigh.  Concerned about possible IT band soreness.  Symptoms come and go.  No back pain.      Also with \"rash\" on bilateral anterior thighs that improves with fasting.  He is interested in food allergy testing.       VITALS     Visit Vitals  /88   Pulse 63   Temp 97.9 °F (36.6 °C)   Ht 177.8 cm (70\")   Wt (!) 149 kg (328 lb)   SpO2 98%   BMI 47.06 kg/m²       BP Readings from Last 3 Encounters:   03/25/25 156/88   09/16/24 122/78   05/25/24 142/80     Wt Readings from Last 3 Encounters:   03/25/25 (!) 149 kg (328 lb)   09/16/24 122 kg (269 lb)   05/25/24 132 kg (290 lb)      Body mass index is 47.06 kg/m².      MEDICATIONS     Current Outpatient Medications on File Prior to Visit   Medication Sig Dispense Refill    hydrOXYzine (ATARAX) 25 MG tablet TAKE 1 TABLET BY MOUTH NIGHTLY AS NEEDED FOR ANXIETY. 30 tablet 0    lisdexamfetamine (Vyvanse) 50 MG capsule Take 1 capsule by mouth Daily 30 capsule 0    valACYclovir (Valtrex) 500 MG tablet Take 1 tablet by mouth Daily. 90 tablet 1    [DISCONTINUED] amphetamine-dextroamphetamine (ADDERALL) 10 MG tablet 2 tablets 2 (Two) Times a Day. (Patient not taking: " Reported on 3/25/2025)      [DISCONTINUED] amphetamine-dextroamphetamine (Adderall) 10 MG tablet Take 1 tablet by mouth 2 (Two) Times a Day. (Patient not taking: Reported on 3/25/2025) 60 tablet 0    [DISCONTINUED] lisdexamfetamine (Vyvanse) 60 MG capsule Take 1 capsule by mouth Every Morning (Patient not taking: Reported on 3/25/2025) 30 capsule 0     No current facility-administered medications on file prior to visit.         HISTORY OF PRESENT ILLNESS      Jorge presents for annual health maintenance visit.    General health: good  Lifestyle:  Attempting to lose weight?: Yes   Diet: eats moderately healthy  Exercise: exercises 4 days weekly  Tobacco: Never used   Alcohol: does not drink, stopped drinking this month  Work: Full-time  Reproductive health:  Sexually active?: Yes   Concern for STD?: No   Sexual problems?: No problems   Sees Urologist?: No   Depression Screening:          3/25/2025    10:02 AM   PHQ-2/PHQ-9 Depression Screening   Little interest or pleasure in doing things Not at all   Feeling down, depressed, or hopeless Not at all   How difficult have these problems made it for you to do your work, take care of things at home, or get along with other people? Not difficult at all         PHQ-2: 0 (Not depressed)     PHQ-9: 0 (Negative screening for depression)    Patient Care Team:  Jeannette Sal APRN as PCP - General (Family Medicine)  ______________________________________________________________________    ALLERGIES  No Known Allergies     PFSH:     The following portions of the patient's history were reviewed and updated as appropriate: Allergies / Current Medications / Past Medical History / Surgical History / Social History / Family History    PROBLEM LIST   Patient Active Problem List   Diagnosis    Pure hypercholesterolemia    ADHD (attention deficit hyperactivity disorder)       PAST MEDICAL HISTORY  Past Medical History:   Diagnosis Date    ADHD (attention deficit hyperactivity  disorder)        SURGICAL HISTORY  Past Surgical History:   Procedure Laterality Date    VASECTOMY         SOCIAL HISTORY  Social History     Socioeconomic History    Marital status: Single   Tobacco Use    Smoking status: Never     Passive exposure: Never    Smokeless tobacco: Never   Vaping Use    Vaping status: Never Used   Substance and Sexual Activity    Alcohol use: Yes     Comment: occ    Drug use: Never    Sexual activity: Yes     Partners: Female       FAMILY HISTORY  Family History   Problem Relation Age of Onset    No Known Problems Mother     No Known Problems Father     Brain cancer Brother     Brain cancer Paternal Uncle        IMMUNIZATION HISTORY  Immunization History   Administered Date(s) Administered    COVID-19 (PFIZER) Purple Cap Monovalent 03/05/2021, 03/26/2021    Tdap 02/20/2023         REVIEW OF SYSTEMS     Review of Systems   Constitutional:  Negative for chills, fever and unexpected weight change.   Respiratory:  Negative for cough, chest tightness and shortness of breath.    Cardiovascular:  Negative for chest pain, palpitations and leg swelling.   Skin:  Positive for rash.   Neurological:  Negative for dizziness, weakness, light-headedness and headaches.   Psychiatric/Behavioral:  The patient is not nervous/anxious.        PHYSICAL EXAMINATION     Physical Exam  Vitals reviewed.   Constitutional:       General: He is not in acute distress.     Appearance: Normal appearance. He is not ill-appearing, toxic-appearing or diaphoretic.   HENT:      Head: Normocephalic and atraumatic.      Right Ear: Tympanic membrane, ear canal and external ear normal. There is no impacted cerumen.      Left Ear: Tympanic membrane, ear canal and external ear normal. There is no impacted cerumen.      Nose: Nose normal. No congestion or rhinorrhea.      Mouth/Throat:      Mouth: Mucous membranes are moist.      Pharynx: Oropharynx is clear. No oropharyngeal exudate or posterior oropharyngeal erythema.   Eyes:  "     Extraocular Movements: Extraocular movements intact.      Conjunctiva/sclera: Conjunctivae normal.      Pupils: Pupils are equal, round, and reactive to light.   Cardiovascular:      Rate and Rhythm: Normal rate and regular rhythm.      Heart sounds: Normal heart sounds.   Pulmonary:      Effort: Pulmonary effort is normal. No respiratory distress.      Breath sounds: Normal breath sounds.   Abdominal:      General: Bowel sounds are normal.      Palpations: Abdomen is soft.      Tenderness: There is no abdominal tenderness.   Musculoskeletal:         General: Normal range of motion.      Cervical back: Normal range of motion and neck supple.      Right lower leg: No edema.      Left lower leg: No edema.   Lymphadenopathy:      Cervical: No cervical adenopathy.   Skin:     General: Skin is warm and dry.      Findings: Rash (keratosis pilaris on anterior thighs) present.   Neurological:      General: No focal deficit present.      Mental Status: He is alert and oriented to person, place, and time. Mental status is at baseline.   Psychiatric:         Mood and Affect: Mood normal.         Behavior: Behavior normal.         Thought Content: Thought content normal.         Judgment: Judgment normal.       REVIEWED DATA      Labs:    Lab Results   Component Value Date     03/25/2024    K 4.4 03/25/2024    CALCIUM 8.7 03/25/2024    AST 20 03/25/2024    ALT 24 03/25/2024    BUN 16 03/25/2024    CREATININE 1.03 03/25/2024    CREATININE 0.81 02/20/2023       Lab Results   Component Value Date    GLUCOSE 93 03/25/2024    HGBA1C 5.10 03/25/2024    HGBA1C 5.1 02/20/2023    TSH 1.540 03/25/2024    FREET4 1.15 03/25/2024       No results found for: \"PSA\"    [unfilled]    Lab Results   Component Value Date     (H) 03/25/2024    HDL 39 (L) 03/25/2024    TRIG 112 03/25/2024    CHOLHDLRATIO 4.28 03/25/2024       No components found for: \"LMGA014W\"    Lab Results   Component Value Date    WBC 5.59 03/25/2024    HGB 13.4 " 03/25/2024    MCV 91.5 03/25/2024     03/25/2024       Lab Results   Component Value Date    PROTEIN Negative 02/20/2023    GLUCOSEU Negative 02/20/2023    BLOODU Negative 02/20/2023    NITRITEU Negative 02/20/2023    LEUKOCYTESUR Negative 02/20/2023          Lab Results   Component Value Date    HEPCVIRUSABY Non Reactive 02/20/2023       Imaging:           Medical Tests:           ASSESSMENT & PLAN     ANNUAL WELLNESS EXAM / PHYSICAL     Diagnoses and all orders for this visit:    1. Annual physical exam (Primary)  -     Cancel: CBC & Differential  -     Cancel: Comprehensive Metabolic Panel  -     Cancel: Hemoglobin A1c  -     Cancel: Lipid Panel With / Chol / HDL Ratio  -     Cancel: TSH+Free T4  -     Cancel: Urinalysis With Microscopic If Indicated (No Culture) - Urine, Clean Catch  -     Hemoglobin A1c  -     Lipid Panel With / Chol / HDL Ratio    2. Elevated blood pressure reading in office without diagnosis of hypertension  -     CBC & Differential  -     Comprehensive Metabolic Panel  -     TSH+Free T4  -     Urinalysis With Microscopic If Indicated (No Culture) - Urine, Clean Catch    3. Neuropathic pain of thigh, left  -     Ambulatory Referral to Physical Therapy for Evaluation & Treatment    4. Rash  -     Ambulatory Referral to Allergy    5. Encounter for blood typing  -     ABO/Rh         Summary/Discussion:     BP markedly elevated at today's visit, likely related to recent weight gain.  Asymptomatic.  Rule out thyroid disease with labs.  Discussed importance of close monitoring at home and he is agreeable to send me daily BP readings for review via [employer] in 1 week.  Encouraged low sodium diet, regular exercise, reduced stress.  If consistently elevated > 130/80, will recommend BP medication.  For his left lateral thigh pain, suspect possible lumbar radiculopathy.  May consider lidocaine pain patches OTC.  Agreeable for PT.  Rash is consistent with likely keratosis pilaris.  Recommend  moisturizing lotion.  He would like to see allergist for concerns of possible food allergy - placed.  Blood type order placed per his request.    Next Appointment with me: Visit date not found    Return in about 1 month (around 4/25/2025) for Recheck HTN (may be telehealth if needed).      HEALTHCARE MAINTENANCE ISSUES       Cancer Screening:  Colon: Initial/Next screening at age: 46  Repeat colon cancer screening: N/A at this time  Prostate: No screening needed at this time  Testicular: Recommended monthly self exam  Skin: Monthly self skin examination, annual exam by health professional  Lung: Does not meet criteria for lung cancer screening.   Other:    Screening Labs & Tests:  Lab results reviewed & discussed with with patient or orders placed today.  EKG:  CV Screening: Lipid panel  DEXA (75+ or risk factors):   HEP C (If born [birth year] or risk factors): Previously had negative screen  Other:     Immunization/Vaccinations (to be given today unless deferred by patient)  Influenza: Recommended annual influenza vaccine  Hepatitis A: Verify immunization records  Hepatitis B: Verify immunization records  Tetanus/Pertussis: Up to date  Pneumovax/PCV: Not needed at this time  Shingles: Not needed at this time  COVID: Considering the latest booster   Lifestyle Counseling:  Lifestyle Modifications: Attempt to lose weight, Improve dietary compliance, Increase intensity/regularity of aerobic exercise, Follow a low fat, low cholesterol diet, Maintain low sodium diet (< 3 gm) for blood pressure, Make dinner the lightest meal of day, and Reduce exposure to stress if possible  Safety Issues: Always wear seatbelt, Avoid texting while driving   Use sunscreen, regular skin examination  Recommended annual dental/vision examination.  Emotional/Stress/Sleep: Reviewed and  given when appropriate      Health Maintenance   Topic Date Due    LIPID PANEL  03/25/2025    COVID-19 Vaccine (3 - 2024-25 season) 09/21/2025 (Originally  9/1/2024)    INFLUENZA VACCINE  09/21/2025 (Originally 7/1/2024)    ANNUAL PHYSICAL  03/25/2026    TDAP/TD VACCINES (2 - Td or Tdap) 02/20/2033    HEPATITIS C SCREENING  Completed    Pneumococcal Vaccine 0-49  Aged Out

## 2025-03-25 ENCOUNTER — OFFICE VISIT (OUTPATIENT)
Dept: INTERNAL MEDICINE | Age: 40
End: 2025-03-25
Payer: COMMERCIAL

## 2025-03-25 VITALS
DIASTOLIC BLOOD PRESSURE: 88 MMHG | WEIGHT: 315 LBS | BODY MASS INDEX: 45.1 KG/M2 | HEART RATE: 63 BPM | OXYGEN SATURATION: 98 % | SYSTOLIC BLOOD PRESSURE: 156 MMHG | TEMPERATURE: 97.9 F | HEIGHT: 70 IN

## 2025-03-25 DIAGNOSIS — R21 RASH: ICD-10-CM

## 2025-03-25 DIAGNOSIS — Z00.00 ANNUAL PHYSICAL EXAM: Primary | ICD-10-CM

## 2025-03-25 DIAGNOSIS — Z01.83 ENCOUNTER FOR BLOOD TYPING: ICD-10-CM

## 2025-03-25 DIAGNOSIS — R03.0 ELEVATED BLOOD PRESSURE READING IN OFFICE WITHOUT DIAGNOSIS OF HYPERTENSION: ICD-10-CM

## 2025-03-25 DIAGNOSIS — M79.2 NEUROPATHIC PAIN OF THIGH, LEFT: ICD-10-CM

## 2025-03-26 LAB
ABO GROUP BLD: NORMAL
ALBUMIN SERPL-MCNC: 4.5 G/DL (ref 3.5–5.2)
ALBUMIN/GLOB SERPL: 1.9 G/DL
ALP SERPL-CCNC: 54 U/L (ref 39–117)
ALT SERPL-CCNC: 28 U/L (ref 1–41)
APPEARANCE UR: CLEAR
AST SERPL-CCNC: 24 U/L (ref 1–40)
BASOPHILS # BLD AUTO: 0.02 10*3/MM3 (ref 0–0.2)
BASOPHILS NFR BLD AUTO: 0.4 % (ref 0–1.5)
BILIRUB SERPL-MCNC: 0.2 MG/DL (ref 0–1.2)
BILIRUB UR QL STRIP: NEGATIVE
BUN SERPL-MCNC: 23 MG/DL (ref 6–20)
BUN/CREAT SERPL: 25.6 (ref 7–25)
CALCIUM SERPL-MCNC: 9.3 MG/DL (ref 8.6–10.5)
CHLORIDE SERPL-SCNC: 105 MMOL/L (ref 98–107)
CHOLEST SERPL-MCNC: 218 MG/DL (ref 0–200)
CHOLEST/HDLC SERPL: 4.45 {RATIO}
CO2 SERPL-SCNC: 22.6 MMOL/L (ref 22–29)
COLOR UR: ABNORMAL
CREAT SERPL-MCNC: 0.9 MG/DL (ref 0.76–1.27)
EGFRCR SERPLBLD CKD-EPI 2021: 112.1 ML/MIN/1.73
EOSINOPHIL # BLD AUTO: 0.11 10*3/MM3 (ref 0–0.4)
EOSINOPHIL NFR BLD AUTO: 2.1 % (ref 0.3–6.2)
ERYTHROCYTE [DISTWIDTH] IN BLOOD BY AUTOMATED COUNT: 12.8 % (ref 12.3–15.4)
GLOBULIN SER CALC-MCNC: 2.4 GM/DL
GLUCOSE SERPL-MCNC: 100 MG/DL (ref 65–99)
GLUCOSE UR QL STRIP: NEGATIVE
HBA1C MFR BLD: 5.2 % (ref 4.8–5.6)
HCT VFR BLD AUTO: 46.2 % (ref 37.5–51)
HDLC SERPL-MCNC: 49 MG/DL (ref 40–60)
HGB BLD-MCNC: 15.1 G/DL (ref 13–17.7)
HGB UR QL STRIP: NEGATIVE
IMM GRANULOCYTES # BLD AUTO: 0.01 10*3/MM3 (ref 0–0.05)
IMM GRANULOCYTES NFR BLD AUTO: 0.2 % (ref 0–0.5)
KETONES UR QL STRIP: NEGATIVE
LDLC SERPL CALC-MCNC: 153 MG/DL (ref 0–100)
LEUKOCYTE ESTERASE UR QL STRIP: NEGATIVE
LYMPHOCYTES # BLD AUTO: 1.55 10*3/MM3 (ref 0.7–3.1)
LYMPHOCYTES NFR BLD AUTO: 29.2 % (ref 19.6–45.3)
MCH RBC QN AUTO: 30.8 PG (ref 26.6–33)
MCHC RBC AUTO-ENTMCNC: 32.7 G/DL (ref 31.5–35.7)
MCV RBC AUTO: 94.3 FL (ref 79–97)
MONOCYTES # BLD AUTO: 0.4 10*3/MM3 (ref 0.1–0.9)
MONOCYTES NFR BLD AUTO: 7.5 % (ref 5–12)
NEUTROPHILS # BLD AUTO: 3.21 10*3/MM3 (ref 1.7–7)
NEUTROPHILS NFR BLD AUTO: 60.6 % (ref 42.7–76)
NITRITE UR QL STRIP: NEGATIVE
NRBC BLD AUTO-RTO: 0 /100 WBC (ref 0–0.2)
PH UR STRIP: 6 [PH] (ref 5–8)
PLATELET # BLD AUTO: 248 10*3/MM3 (ref 140–450)
POTASSIUM SERPL-SCNC: 4.4 MMOL/L (ref 3.5–5.2)
PROT SERPL-MCNC: 6.9 G/DL (ref 6–8.5)
PROT UR QL STRIP: NEGATIVE
RBC # BLD AUTO: 4.9 10*6/MM3 (ref 4.14–5.8)
RH BLD: POSITIVE
SODIUM SERPL-SCNC: 138 MMOL/L (ref 136–145)
SP GR UR STRIP: 1.02 (ref 1–1.03)
T4 FREE SERPL-MCNC: 1.39 NG/DL (ref 0.92–1.68)
TRIGL SERPL-MCNC: 90 MG/DL (ref 0–150)
TSH SERPL DL<=0.005 MIU/L-ACNC: 1.49 UIU/ML (ref 0.27–4.2)
UROBILINOGEN UR STRIP-MCNC: ABNORMAL MG/DL
VLDLC SERPL CALC-MCNC: 16 MG/DL (ref 5–40)
WBC # BLD AUTO: 5.3 10*3/MM3 (ref 3.4–10.8)

## 2025-03-31 DIAGNOSIS — G47.9 SLEEP DISTURBANCE: ICD-10-CM

## 2025-03-31 RX ORDER — HYDROXYZINE HYDROCHLORIDE 25 MG/1
25 TABLET, FILM COATED ORAL NIGHTLY PRN
Qty: 30 TABLET | Refills: 0 | Status: SHIPPED | OUTPATIENT
Start: 2025-03-31

## 2025-04-15 DIAGNOSIS — Z86.19 H/O COLD SORES: ICD-10-CM

## 2025-04-16 RX ORDER — VALACYCLOVIR HYDROCHLORIDE 500 MG/1
500 TABLET, FILM COATED ORAL DAILY
Qty: 90 TABLET | Refills: 0 | Status: SHIPPED | OUTPATIENT
Start: 2025-04-16

## 2025-07-13 DIAGNOSIS — Z86.19 H/O COLD SORES: Primary | ICD-10-CM

## 2025-07-14 RX ORDER — VALACYCLOVIR HYDROCHLORIDE 500 MG/1
500 TABLET, FILM COATED ORAL DAILY
Qty: 30 TABLET | Refills: 0 | Status: SHIPPED | OUTPATIENT
Start: 2025-07-14